# Patient Record
Sex: FEMALE | Race: OTHER
[De-identification: names, ages, dates, MRNs, and addresses within clinical notes are randomized per-mention and may not be internally consistent; named-entity substitution may affect disease eponyms.]

---

## 2019-01-01 ENCOUNTER — HOSPITAL ENCOUNTER (INPATIENT)
Dept: HOSPITAL 53 - M ED | Age: 24
LOS: 2 days | Discharge: HOME | DRG: 881 | End: 2019-01-03
Attending: PSYCHIATRY & NEUROLOGY | Admitting: PSYCHIATRY & NEUROLOGY
Payer: COMMERCIAL

## 2019-01-01 VITALS — DIASTOLIC BLOOD PRESSURE: 79 MMHG | SYSTOLIC BLOOD PRESSURE: 120 MMHG

## 2019-01-01 VITALS — HEIGHT: 62 IN | BODY MASS INDEX: 21.54 KG/M2 | WEIGHT: 117.07 LBS

## 2019-01-01 VITALS — DIASTOLIC BLOOD PRESSURE: 88 MMHG | SYSTOLIC BLOOD PRESSURE: 130 MMHG

## 2019-01-01 DIAGNOSIS — F10.94: ICD-10-CM

## 2019-01-01 DIAGNOSIS — Z91.018: ICD-10-CM

## 2019-01-01 DIAGNOSIS — Z63.4: ICD-10-CM

## 2019-01-01 DIAGNOSIS — Z91.040: ICD-10-CM

## 2019-01-01 DIAGNOSIS — E03.9: ICD-10-CM

## 2019-01-01 DIAGNOSIS — F10.10: ICD-10-CM

## 2019-01-01 DIAGNOSIS — F17.200: ICD-10-CM

## 2019-01-01 DIAGNOSIS — F32.9: Primary | ICD-10-CM

## 2019-01-01 LAB
ALBUMIN SERPL BCG-MCNC: 4.2 GM/DL (ref 3.2–5.2)
ALT SERPL W P-5'-P-CCNC: 37 U/L (ref 12–78)
AMPHETAMINES UR QL SCN: NEGATIVE
APAP SERPL-MCNC: < 2 UG/ML (ref 10–30)
B-HCG SERPL QL: NEGATIVE
BARBITURATES UR QL SCN: NEGATIVE
BENZODIAZ UR QL SCN: NEGATIVE
BILIRUB CONJ SERPL-MCNC: 0.2 MG/DL (ref 0–0.2)
BILIRUB SERPL-MCNC: 0.5 MG/DL (ref 0.2–1)
BUN SERPL-MCNC: 8 MG/DL (ref 7–18)
BZE UR QL SCN: NEGATIVE
CALCIUM SERPL-MCNC: 8.1 MG/DL (ref 8.5–10.1)
CANNABINOIDS UR QL SCN: NEGATIVE
CHLORIDE SERPL-SCNC: 107 MEQ/L (ref 98–107)
CO2 SERPL-SCNC: 22 MEQ/L (ref 21–32)
CREAT SERPL-MCNC: 0.8 MG/DL (ref 0.55–1.3)
ETHANOL SERPL-MCNC: 0.32 % (ref 0–0.01)
GFR SERPL CREATININE-BSD FRML MDRD: > 60 ML/MIN/{1.73_M2} (ref 60–?)
GLUCOSE SERPL-MCNC: 101 MG/DL (ref 70–100)
HCT VFR BLD AUTO: 40.7 % (ref 36–47)
HGB BLD-MCNC: 13.6 G/DL (ref 12–15.5)
MCH RBC QN AUTO: 29 PG (ref 27–33)
MCHC RBC AUTO-ENTMCNC: 33.4 G/DL (ref 32–36.5)
MCV RBC AUTO: 86.8 FL (ref 80–96)
METHADONE UR QL SCN: NEGATIVE
OPIATES UR QL SCN: NEGATIVE
PCP UR QL SCN: NEGATIVE
PLATELET # BLD AUTO: 355 10^3/UL (ref 150–450)
POTASSIUM SERPL-SCNC: 3 MEQ/L (ref 3.5–5.1)
PROT SERPL-MCNC: 7.6 GM/DL (ref 6.4–8.2)
RBC # BLD AUTO: 4.69 10^6/UL (ref 4–5.4)
SALICYLATES SERPL-MCNC: < 1.7 MG/DL (ref 5–30)
SODIUM SERPL-SCNC: 144 MEQ/L (ref 136–145)
TSH SERPL DL<=0.005 MIU/L-ACNC: 1.7 UIU/ML (ref 0.36–3.74)
WBC # BLD AUTO: 7.8 10^3/UL (ref 4–10)

## 2019-01-01 RX ADMIN — MULTIPLE VITAMINS W/ MINERALS TAB SCH TAB: TAB at 16:20

## 2019-01-01 RX ADMIN — FOLIC ACID SCH MG: 1 TABLET ORAL at 16:20

## 2019-01-01 SDOH — SOCIAL STABILITY - SOCIAL INSECURITY: DISSAPEARANCE AND DEATH OF FAMILY MEMBER: Z63.4

## 2019-01-02 VITALS — SYSTOLIC BLOOD PRESSURE: 140 MMHG | DIASTOLIC BLOOD PRESSURE: 83 MMHG

## 2019-01-02 VITALS — DIASTOLIC BLOOD PRESSURE: 76 MMHG | SYSTOLIC BLOOD PRESSURE: 129 MMHG

## 2019-01-02 LAB
BUN SERPL-MCNC: 13 MG/DL (ref 7–18)
CALCIUM SERPL-MCNC: 8.9 MG/DL (ref 8.5–10.1)
CHLORIDE SERPL-SCNC: 106 MEQ/L (ref 98–107)
CO2 SERPL-SCNC: 26 MEQ/L (ref 21–32)
CREAT SERPL-MCNC: 0.65 MG/DL (ref 0.55–1.3)
GFR SERPL CREATININE-BSD FRML MDRD: > 60 ML/MIN/{1.73_M2} (ref 60–?)
GLUCOSE SERPL-MCNC: 81 MG/DL (ref 70–100)
POTASSIUM SERPL-SCNC: 4 MEQ/L (ref 3.5–5.1)
SODIUM SERPL-SCNC: 139 MEQ/L (ref 136–145)

## 2019-01-02 RX ADMIN — MULTIPLE VITAMINS W/ MINERALS TAB SCH TAB: TAB at 09:38

## 2019-01-02 RX ADMIN — FOLIC ACID SCH MG: 1 TABLET ORAL at 09:38
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
## 2019-01-02 NOTE — HPE
DATE OF ADMISSION:  01/01/2019

 

HISTORY OF PRESENT ILLNESS: Please refer to psychiatric history and evaluation

for further details on this admission. This examination and history is intended

for medical issues, which may need treatment, followup, or consult on this

23-year-old female.

 

ALLERGIES: No known allergies.

 

PRIMARY CARE PROVIDER: Kedar.

 

SOCIAL HISTORY: She is . Her  is a soldier, currently stationed at

[place]. EtOH (ethanol): At least twice a week, mixed drinks. Smokes: None.

Recreational drug use: None.

 

PAST MEDICAL HISTORY: Negative.

 

PAST SURGICAL HISTORY: Negative.

 

FAMILY HISTORY: Noncontributory.

 

HOME MEDICATIONS:

- Lexapro 200 mg by mouth daily

- multivitamin one by mouth daily

- Biotin 1 mg by mouth daily

 

LABORATORY STUDIES:

CBC was normal. Sodium 144, potassium 3.0; replacement was given. Will recheck

potassium in the morning. Chloride 107, BUN 8, creatinine 0.8, EtOH (ethanol)

0.315.

 

REVIEW OF SYSTEMS: A 10-systems review was done and was unremarkable.

 

PHYSICAL EXAMINATION: A 23-year-old cooperative female in no acute distress.

Vital signs are stable. Height 62 inches, weight 53.1 kg, body mass index (BMI)

21.4. Blood pressure 109/67, pulse 100, respirations 16, oxygen saturation 96% on

room air, temperature 99. Patient is alert and oriented times three. Pupils equal

and reactive to light. Extraocular movements (EOMs) intact. Corneae and sclerae

clear. Conjunctivae are normal. No facial asymmetry. Pharynx, tongue, gums pink

and moist. Tongue is midline. Neck is supple without lymphadenopathy. No

thyromegaly. No goiter. Carotids 2+ without bruit. Chest clear to auscultation

without wheeze or retraction. Heart is regular. Abdomen benign. Bowel sounds

positive. Genitourinary/rectal not done. Extremities show equal strength, full

range of motion.  No cyanosis, clubbing, or edema. Peripheral pulses equal and

palpable bilaterally. Skin is warm and dry.

 

IMPRESSION AND PLAN:

1. Psychiatric plan her psychiatry.

2. Hypothyroidism. Replacement given. Will recheck in the morning.

3. Monitor for alcohol withdrawal.

 

No other acute medical issues.
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
## 2019-03-09 ENCOUNTER — HOSPITAL ENCOUNTER (EMERGENCY)
Dept: HOSPITAL 53 - M ED | Age: 24
Discharge: HOME | End: 2019-03-09
Payer: COMMERCIAL

## 2019-03-09 VITALS — DIASTOLIC BLOOD PRESSURE: 78 MMHG | SYSTOLIC BLOOD PRESSURE: 123 MMHG

## 2019-03-09 VITALS — BODY MASS INDEX: 20.95 KG/M2 | WEIGHT: 118.26 LBS | HEIGHT: 63 IN

## 2019-03-09 DIAGNOSIS — K11.8: Primary | ICD-10-CM

## 2019-03-09 DIAGNOSIS — Z91.018: ICD-10-CM

## 2019-08-13 ENCOUNTER — HOSPITAL ENCOUNTER (EMERGENCY)
Dept: HOSPITAL 53 - M ED | Age: 24
Discharge: HOME | End: 2019-08-13
Payer: COMMERCIAL

## 2019-08-13 VITALS — WEIGHT: 120.37 LBS | HEIGHT: 62 IN | BODY MASS INDEX: 22.15 KG/M2

## 2019-08-13 VITALS — SYSTOLIC BLOOD PRESSURE: 128 MMHG | DIASTOLIC BLOOD PRESSURE: 84 MMHG
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
## 2019-08-13 DIAGNOSIS — R10.2: Primary | ICD-10-CM

## 2019-08-13 DIAGNOSIS — Z91.018: ICD-10-CM

## 2019-08-13 DIAGNOSIS — Z77.098: ICD-10-CM

## 2019-08-13 DIAGNOSIS — T19.2XXA: ICD-10-CM
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
## 2019-08-29 ENCOUNTER — HOSPITAL ENCOUNTER (EMERGENCY)
Dept: HOSPITAL 53 - M ED | Age: 24
Discharge: HOME | End: 2019-08-29
Payer: COMMERCIAL

## 2019-08-29 VITALS — SYSTOLIC BLOOD PRESSURE: 134 MMHG | DIASTOLIC BLOOD PRESSURE: 85 MMHG

## 2019-08-29 VITALS — HEIGHT: 63 IN | BODY MASS INDEX: 20.77 KG/M2 | WEIGHT: 117.24 LBS

## 2019-08-29 DIAGNOSIS — M54.9: ICD-10-CM

## 2019-08-29 DIAGNOSIS — R14.0: Primary | ICD-10-CM

## 2019-08-29 DIAGNOSIS — F10.10: ICD-10-CM

## 2019-08-29 DIAGNOSIS — Z91.018: ICD-10-CM

## 2019-08-29 DIAGNOSIS — F41.1: ICD-10-CM

## 2019-08-29 DIAGNOSIS — F32.9: ICD-10-CM

## 2019-08-29 LAB
ALBUMIN SERPL BCG-MCNC: 4.1 GM/DL (ref 3.2–5.2)
ALT SERPL W P-5'-P-CCNC: 15 U/L (ref 12–78)
BASOPHILS # BLD AUTO: 0 10^3/UL (ref 0–0.2)
BASOPHILS NFR BLD AUTO: 0.3 % (ref 0–1)
BILIRUB CONJ SERPL-MCNC: 0.1 MG/DL (ref 0–0.2)
BILIRUB SERPL-MCNC: 0.4 MG/DL (ref 0.2–1)
EOSINOPHIL # BLD AUTO: 0.1 10^3/UL (ref 0–0.5)
EOSINOPHIL NFR BLD AUTO: 0.6 % (ref 0–3)
HCT VFR BLD AUTO: 38.8 % (ref 36–47)
HGB BLD-MCNC: 13.1 G/DL (ref 12–15.5)
LIPASE SERPL-CCNC: 59 U/L (ref 73–393)
LYMPHOCYTES # BLD AUTO: 3.5 10^3/UL (ref 1.5–6.5)
LYMPHOCYTES NFR BLD AUTO: 44.1 % (ref 24–44)
MCH RBC QN AUTO: 29.7 PG (ref 27–33)
MCHC RBC AUTO-ENTMCNC: 33.8 G/DL (ref 32–36.5)
MCV RBC AUTO: 88 FL (ref 80–96)
MONOCYTES # BLD AUTO: 0.7 10^3/UL (ref 0–0.8)
MONOCYTES NFR BLD AUTO: 8.7 % (ref 0–5)
NEUTROPHILS # BLD AUTO: 3.6 10^3/UL (ref 1.8–7.7)
NEUTROPHILS NFR BLD AUTO: 46 % (ref 36–66)
PLATELET # BLD AUTO: 243 10^3/UL (ref 150–450)
PROT SERPL-MCNC: 7.2 GM/DL (ref 6.4–8.2)
RBC # BLD AUTO: 4.41 10^6/UL (ref 4–5.4)
WBC # BLD AUTO: 7.9 10^3/UL (ref 4–10)

## 2019-08-29 PROCEDURE — 74021 RADEX ABDOMEN 3+ VIEWS: CPT

## 2019-08-29 PROCEDURE — 84702 CHORIONIC GONADOTROPIN TEST: CPT

## 2019-08-29 PROCEDURE — 83690 ASSAY OF LIPASE: CPT

## 2019-08-29 PROCEDURE — 80047 BASIC METABLC PNL IONIZED CA: CPT

## 2019-08-29 PROCEDURE — 93041 RHYTHM ECG TRACING: CPT

## 2019-08-29 PROCEDURE — 80076 HEPATIC FUNCTION PANEL: CPT

## 2019-08-29 PROCEDURE — 85025 COMPLETE CBC W/AUTO DIFF WBC: CPT

## 2019-08-29 PROCEDURE — 99284 EMERGENCY DEPT VISIT MOD MDM: CPT

## 2019-08-29 PROCEDURE — 96374 THER/PROPH/DIAG INJ IV PUSH: CPT

## 2019-08-29 PROCEDURE — 96361 HYDRATE IV INFUSION ADD-ON: CPT
